# Patient Record
Sex: MALE | Race: NATIVE HAWAIIAN OR OTHER PACIFIC ISLANDER
[De-identification: names, ages, dates, MRNs, and addresses within clinical notes are randomized per-mention and may not be internally consistent; named-entity substitution may affect disease eponyms.]

---

## 2017-01-31 ENCOUNTER — HOSPITAL ENCOUNTER (OUTPATIENT)
Dept: HOSPITAL 39 - GMAH | Age: 67
End: 2017-01-31
Attending: FAMILY MEDICINE
Payer: MEDICARE

## 2017-01-31 DIAGNOSIS — Z12.5: ICD-10-CM

## 2017-01-31 DIAGNOSIS — I50.22: Primary | ICD-10-CM

## 2017-01-31 PROCEDURE — 84550 ASSAY OF BLOOD/URIC ACID: CPT

## 2017-01-31 PROCEDURE — 84443 ASSAY THYROID STIM HORMONE: CPT

## 2018-07-16 ENCOUNTER — HOSPITAL ENCOUNTER (OUTPATIENT)
Dept: HOSPITAL 39 - GMAH | Age: 68
End: 2018-07-16
Attending: FAMILY MEDICINE
Payer: COMMERCIAL

## 2018-07-16 DIAGNOSIS — I25.810: Primary | ICD-10-CM

## 2018-07-16 DIAGNOSIS — Z12.5: ICD-10-CM

## 2018-07-16 DIAGNOSIS — D72.829: ICD-10-CM

## 2018-07-16 PROCEDURE — 84443 ASSAY THYROID STIM HORMONE: CPT

## 2018-07-16 PROCEDURE — 84550 ASSAY OF BLOOD/URIC ACID: CPT

## 2019-01-15 ENCOUNTER — HOSPITAL ENCOUNTER (OUTPATIENT)
Dept: HOSPITAL 39 - LAB.O | Age: 69
End: 2019-01-15
Attending: FAMILY MEDICINE
Payer: COMMERCIAL

## 2019-01-15 DIAGNOSIS — I71.2: Primary | ICD-10-CM

## 2019-01-15 DIAGNOSIS — I70.0: ICD-10-CM

## 2019-01-15 DIAGNOSIS — D72.829: ICD-10-CM

## 2019-01-15 NOTE — CT
EXAM DESCRIPTION: 

Chest w/o Contrast : Computed Tomography.



CLINICAL HISTORY:

68 years Male THORACIC AORTIC ANEURYSM, WITHOUT RUPTURE



COMPARISON: 

CTA of the abdominal aorta and bilateral lower extremities on

this visit.



TECHNIQUE: 

Spiral-axial scans at 5 x 5 mm intervals through the lungs and

thorax without IV contrast. 2.5 x 5 mm lung algorithm axial

reconstructions. Coronal and sagittal 2.0 pleural parenchymal

scarring in the right apex inferior right middle lobe and

inferior lingula and in the posterior recess of the right lower

lobe. Mm reconstructions. No adverse reactions.  Total Exam DLP:

628.22 mGy-cm. This exam was performed according to our

departmental dose-optimization program which includes automated

exposure control, adjustment of the mA and/or kV according to

patient size and/or use of iterative reconstruction technique; to

reduce radiation dose to as low as reasonably achievable (ALARA).

 Nodule measurements under 10 mm are given as mean value of 3

axes diameters.



FINDINGS: 

Lungs and large airways: Partially solid and groundglass 5 mm

nodule subpleural lateral right middle lobe associated with

pleural extension laterally into the major fissure on axial

series 4, image 57. Stable since the prior study. Bilateral small

blebs in a centrilobular pattern predominantly in the upper

lobes. 1.6 x 1.1 cm density which appears to be part of the

lateral pleural thickening in the inferior lingula with

associated parenchymal scarring.



Pleural spaces: Bilateral pleural thickening as previously

described. No effusion or pneumothorax.



Mediastinum and Radha: Evaluation limited due to lack of IV

contrast scattered nodes throughout the mediastinum but no

enlarged nodes. No enlarged hilar nodes. Surgical clips. No soft

tissue masses.



Great vessels and Heart: Evaluation limited due to lack of IV

contrast. Atherosclerotic calcification of the proximal

brachiocephalic cephalic vessels, aortic arch, some coronary

arteries, and descending thoracic aorta. Coronary artery stents.



Soft tissues of neck base, axillae, and chest wall: Evaluation

limited due to lack of IV contrast. Small thyroid gland.

Bilateral axillary lymph nodes normal size.



Upper abdomen: Please see CTA abdominal aorta findings.



Osseous structures: Minimal spondylosis. Sternotomy wires.

Minimal arthrosis of the sternum. Anterior and posterior right

labrum and glenoid with degenerative change..



IMPRESSION: 

1. Minimal emphysematous changes bilaterally. Focal pleural

scarring more likely than groundglass nodule in the lateral

segment of the right middle lobe. Stable since the prior study

abutting the inferior lingula, taking into account differences in

CT scanning technique. Also focal pleural scarring in the

inferior lingula.

2. No hilar or mediastinal adenopathy or soft tissue mass and

other soft tissues are unremarkable. Limited evaluation due to

lack of IV contrast.

3. Right glenoid labrum and right anterior glenoid degenerative

changes. Correlate with clinical history and consider follow-up

MRI or CT, or MRI/CT arthrogram of the right shoulder.



Electronically signed by:  Eliu Jack MD  1/15/2019 5:03 PM [facility]

Workstation: [id number]

## 2019-01-15 NOTE — CT
EXAM DESCRIPTION: 

CTA Runoff: Computed Tomography.

CLINICAL HISTORY: 

THORACIC AORTIC ANEURYSM



COMPARISON: 

CT chest without contrast on this visit.



TECHNIQUE: 

CT angiography of the abdominal aorta and both lower extremities

is performed during rapid bolus administration of IV contrast

media. Three-dimensional volume-rendering imaging is reviewed

along with 2.5 x 2.5 mm source images, and 2 mm coronal and

sagittal reformats.  Total Exam DLP: 1642.39 mGy-cm.  This exam

was performed according to our departmental CT dose-optimization

program which includes automated exposure control, adjustment of

the mA and/or kV according to patient size and/or use of

iterative reconstruction technique; to reduce radiation dose to

as low as reasonably achievable (ALARA).



FINDINGS: 

Upper abdominal aorta: Atherosclerotic calcification of the ostia

of the celiac axis and the SMA no significant stenosis or

poststenotic dilation. No aortic aneurysm.

Mid-abdominal aorta: Bilateral single renal arteries with

calcification of the ostia but no stenosis or dilation. Moderate

atherosclerotic calcification. No aortic aneurysm..

Distal abdominal aorta: Moderate calcification including the

origin of the DIANE. Luminal narrowing of the aorta with no

aneurysm.

Common iliacs: Minimal atherosclerotic calcification bilaterally

with no significant narrowing or aneurysm.

Internal iliacs: Origins are unremarkable with intermittent

atherosclerotic calcification and normal caliber.

Bilateral external iliac artery: Negative.

Bilateral CFA's: Moderate calcification and minimal narrowing. No

abnormalities of the bifurcation.

Bilateral SFA's: No significant calcification narrowing or

aneurysm.

Bilateral popliteal's no significant calcification narrowing or

aneurysm.

Right trifurcation vessels: . Good Visualization of the peroneal

PTA trunk with the peroneal terminating normally just above the

ankle mortise. Good runoff of the PTA into the high posterior

ankle and plantar foot.] Fair visualization of the MIKO. Poor

runoff into the dorsalis pedis artery. 

Left trifurcation vessels: . Good visualization of the peroneal

PTA trunk. Good to fair visualization of the peroneal which

terminates normally above the ankle mortise. Good visualization

of the entire left PTA and good runoff into the left plantar

foot. Good 2 fair visualization of the left MIKO and fair runoff

into the left dorsalis pedis.

Other: Small periaortic nodes. No free air or free fluid or

peritoneal or retroperitoneal mass. Calcification in the prostate

gland. Bilateral fatty inguinal canal hernias not containing

bowel. Grade 1 anterolisthesis L4-5.



IMPRESSION: 

1. Mild to moderate atherosclerotic changes in the abdominal

aorta with no significant stenosis of the aorta or the major

branch vessels near the origins. No aneurysms.

2. Minimal iliac disease bilaterally and no aneurysms or

stenoses.

3. Bilateral femoral arterial supply is good.

4. Right inferior lower extremity arterial supply is good but

poor runoff from the MIKO into the dorsalis pedis artery. Left

inferior lower extremity arterial supply is in good with good

fair runoff into the posterior inferior foot and the anterior

dorsal foot. ECMO details.



Electronically signed by:  Eliu Jack MD  1/15/2019 4:39 PM [facility]

Workstation: [id number]

## 2019-07-17 ENCOUNTER — HOSPITAL ENCOUNTER (OUTPATIENT)
Dept: HOSPITAL 39 - GMAH | Age: 69
End: 2019-07-17
Attending: FAMILY MEDICINE
Payer: COMMERCIAL

## 2019-07-17 DIAGNOSIS — C91.10: Primary | ICD-10-CM

## 2019-07-25 ENCOUNTER — HOSPITAL ENCOUNTER (OUTPATIENT)
Dept: HOSPITAL 39 - GMAH | Age: 69
End: 2019-07-25
Attending: FAMILY MEDICINE
Payer: COMMERCIAL

## 2019-07-25 DIAGNOSIS — I42.9: Primary | ICD-10-CM

## 2019-07-25 DIAGNOSIS — Z12.5: ICD-10-CM

## 2019-10-21 ENCOUNTER — HOSPITAL ENCOUNTER (OUTPATIENT)
Dept: HOSPITAL 39 - GMA MATASK | Age: 69
End: 2019-10-21
Attending: FAMILY MEDICINE
Payer: COMMERCIAL

## 2019-10-21 DIAGNOSIS — I10: ICD-10-CM

## 2019-10-21 DIAGNOSIS — C91.10: Primary | ICD-10-CM

## 2020-01-29 ENCOUNTER — HOSPITAL ENCOUNTER (OUTPATIENT)
Dept: HOSPITAL 39 - GMA MATASK | Age: 70
End: 2020-01-29
Attending: FAMILY MEDICINE
Payer: COMMERCIAL

## 2020-01-29 DIAGNOSIS — C91.10: Primary | ICD-10-CM

## 2020-02-04 ENCOUNTER — HOSPITAL ENCOUNTER (OUTPATIENT)
Dept: HOSPITAL 39 - CT | Age: 70
End: 2020-02-04
Attending: INTERNAL MEDICINE
Payer: COMMERCIAL

## 2020-02-04 DIAGNOSIS — R16.1: ICD-10-CM

## 2020-02-04 DIAGNOSIS — C91.11: Primary | ICD-10-CM

## 2020-02-04 DIAGNOSIS — C90.00: ICD-10-CM

## 2020-02-05 NOTE — CT
EXAM DESCRIPTION: Abdomen/Pelvis w/Contrast



CLINICAL HISTORY: 69 years Male, RESTAGING LYMPHCYTIC LEUKEMIA



COMPARISON: CTA runoff dated 1/15/2019.



TECHNIQUE: Contiguous 3 mm axial images were obtained from the

lung bases to the level of the proximal femora after  the

administration of intravenous and oral contrast. Sagittal and

coronal reconstructions were reviewed.



FINDINGS: 



THORAX: The imaged lower thorax demonstrates no gross

abnormality.



LIVER: The liver demonstrates normal size and density with no

intrahepatic biliary ductal dilatation  or focal masses.



GALLBLADDER: Grossly unremarkable.



PANCREAS: Appears normal with no cystic or solid lesions.



SPLEEN: Mildly enlarged in size measuring 16.4 x 14.2 cm.



ADRENAL GLANDS: Normal with no nodules or masses.



KIDNEYS: Nonspecific bilateral perinephric stranding. However no

nephrolithiasis or hydronephrosis. No focal masses are

identified.  The visualized ureters appear grossly unremarkable. 





STOMACH: Not well distended limiting detailed evaluation.



SMALL BOWEL: The small bowel loops demonstrate variable degrees

of distention with no abnormal dilatation or other signs to

suggest bowel obstruction.



LARGE BOWEL: Majority of the colon is not well-distended limiting

detailed evaluation. Few diverticuli are identified. The appendix

is well-visualized and appears normal



No evidence of free intraperitoneal air or fluid.



RETROPERITONEUM: The abdominal aorta is nonaneurysmal with

moderate to severe atherosclerosis. The inferior vena cava is

normal in size and caliber. Multiple peripancreatic lymph nodes

measuring up to 2 cm are again noted. Few retroperitoneal lymph

nodes measuring up to 9 mm are also identified. Few subcentimeter

mesenteric lymph nodes are present. 1.2 cm left external iliac

lymph node and 8mm right external iliac lymph node. Multiple

bilateral inguinal lymph nodes measuring up to 1.2 cm.



URINARY BLADDER:The urinary bladder is well-distended with no

gross abnormality.



The prostate gland demonstrates few calcifications. Seminal

vesicles appear normal.



ADDITIONAL FINDINGS: Fat-containing bilateral inguinal hernias,

slightly larger on the left side. 



BONES:  Mild degenerative changes are identified in the

visualized bones. Minimal anterolisthesis of L4 over L5 secondary

to facet arthropathy.



IMPRESSION:

Stable peripancreatic, retroperitoneal, mesenteric, bilateral

external iliac and inguinal lymphadenopathy compared to prior

examination dated 1/15/2019.



Persistent mild splenomegaly.



No evidence of new metastatic disease within the abdomen and

pelvis.



This exam was performed according to our departmental

dose-optimization program, which includes automated exposure

control, adjustment of the mA and/or kV according to patient size

and/or use of iterative reconstruction technique.



Electronically signed by:  Melany Mcclellan MD  2/5/2020 10:27 AM

[facility] Workstation: [id number]

## 2020-02-05 NOTE — CT
EXAM DESCRIPTION: Chest w/Contrast



CLINICAL HISTORY: 69 years Male, RESTAGING LYMPHCYTIC LEUKEMIA



COMPARISON: CT chest dated 1/15/2019. CT runoff dated 1/15/2019.



TECHNIQUE: Contiguous thin section axial images through the chest

were obtained after  the administration of intravenous contrast.

Sagittal and coronal reconstructions were reviewed.



FINDINGS: The visualized thyroid gland and supraclavicular region

appear normal. Few mediastinal lymph nodes measuring up to 8mm

are noted. No abnormally enlarged bilateral hilar

lymphadenopathy. Multiple bilateral axillary lymph nodes

measuring up to 1.3 cm are noted. These also appear unchanged

compared to prior examination.



Trachea is midline and the central tracheobronchial tree is

patent. Linear atelectasis is noted in the visualized lungs. No

evidence of consolidative or groundglass changes. No nodules or

masses are visualized. No evidence of pleural effusions. 



The heart is normal in size with no pericardial effusion. The

visualized aorta is nonaneurysmal with moderate to severe

atherosclerosis. The superior vena cava is normal in size and

caliber.No significant coronary artery atherosclerosis. The

esophagus appears normal throughout its visualized length.



Mild degenerative changes are identified throughout the

visualized spine.



IMPRESSION:

Stable mediastinal and bilateral axillary lymphadenopathy

compared to prior examination dated 1/15/2019. No other newly

enlarged lymph nodes are visualized.



This exam was performed according to our departmental

dose-optimization program, which includes automated exposure

control, adjustment of the mA and/or kV according to patient size

and/or use of iterative reconstruction technique.



Electronically signed by:  Melany Mcclellan MD  2/5/2020 10:22 AM

[facility] Workstation: [id number]

## 2020-09-21 ENCOUNTER — HOSPITAL ENCOUNTER (OUTPATIENT)
Dept: HOSPITAL 39 - CT | Age: 70
End: 2020-09-21
Attending: INTERNAL MEDICINE
Payer: COMMERCIAL

## 2020-09-21 DIAGNOSIS — I25.10: ICD-10-CM

## 2020-09-21 DIAGNOSIS — C91.10: ICD-10-CM

## 2020-09-21 DIAGNOSIS — M51.86: ICD-10-CM

## 2020-09-21 DIAGNOSIS — M51.46: ICD-10-CM

## 2020-09-21 DIAGNOSIS — Z01.812: Primary | ICD-10-CM

## 2020-09-21 NOTE — CT
EXAM DESCRIPTION: 



Chest w/Contrast (accession [id number]), Abdomen/Pelvis

w/Contrast (accession [id number])



CLINICAL HISTORY: 



chronic lymphocytic leukemia of b-cell



COMPARISON: 



Chest CT February 4, 2020, CT abdomen pelvis January 15, 2019



TECHNIQUE: 



CT of the chest, abdomen and Pelvis was performed with IV

contrast. This exam was performed according to our departmental

dose-optimization program, which includes automated exposure

control, adjustment of the mA and/or kV according to patient size

and/or use of iterative reconstruction technique.



FINDINGS: 



CT Chest:



The thyroid and thoracic inlet are unremarkable. Postoperative

changes in the mediastinum. Coronary artery calcification with

additional mural calcification in the thoracic aorta, no aneurysm

or dissection. No pleural or pericardial effusion. No mediastinal

or hilar adenopathy. Axillary and mediastinal adenopathy seen on

the prior chest CT are not present on today's exam. No esophageal

wall thickening.



The central airways are clear. No airspace consolidation or lung

mass. Platelike atelectasis or scarring in the right lower lobe.

Mild subsegmental atelectasis or scarring in the lingula tiny

calcified granuloma in the left lower lobe. Elevated right

hemidiaphragm.



No axillary adenopathy or other chest wall lesion. No lytic or

sclerotic bone lesion.



CT Abdomen/Pelvis:



Mural calcification in the abdominal aorta without aneurysm or

dissection. Nonenlarged bilateral inguinal lymph nodes, stable.

No retroperitoneal or mesenteric adenopathy. The liver, spleen,

pancreas, adrenals and kidneys are unremarkable except for small

cyst superior pole right kidney.



No dilated small bowel loops or mesenteric inflammation. No

bladder wall thickening. The prostate is not enlarged. Colonic

diverticulosis without diverticulitis, normal appendix.

Degenerative changes in the lumbar spine at several levels with

grade 1 anterolisthesis at L4-5 likely related to underlying

facet joint degeneration. Compression of the L3 body likely

related to a large Schmorl's node superior endplate, but this is

new from January, 2019.



IMPRESSION: 



No evidence of recurrent or metastatic disease in the chest,

abdomen or pelvis.



New compression of the L3 vertebral body with a large Schmorl's

node superior endplate at least partially accounting for loss of

vertebral body height. If clinically suspicious of acute

compression fracture, MRI should be considered.



Atherosclerotic vascular disease including coronary artery

disease.



Electronically signed by:  Mukund Hernandez MD  9/21/2020 10:01 AM CDT

Workstation: [id number]

## 2020-09-21 NOTE — CT
EXAM DESCRIPTION: 



Chest w/Contrast (accession [id number]), Abdomen/Pelvis

w/Contrast (accession [id number])



CLINICAL HISTORY: 



chronic lymphocytic leukemia of b-cell



COMPARISON: 



Chest CT February 4, 2020, CT abdomen pelvis January 15, 2019



TECHNIQUE: 



CT of the chest, abdomen and Pelvis was performed with IV

contrast. This exam was performed according to our departmental

dose-optimization program, which includes automated exposure

control, adjustment of the mA and/or kV according to patient size

and/or use of iterative reconstruction technique.



FINDINGS: 



CT Chest:



The thyroid and thoracic inlet are unremarkable. Postoperative

changes in the mediastinum. Coronary artery calcification with

additional mural calcification in the thoracic aorta, no aneurysm

or dissection. No pleural or pericardial effusion. No mediastinal

or hilar adenopathy. Axillary and mediastinal adenopathy seen on

the prior chest CT are not present on today's exam. No esophageal

wall thickening.



The central airways are clear. No airspace consolidation or lung

mass. Platelike atelectasis or scarring in the right lower lobe.

Mild subsegmental atelectasis or scarring in the lingula tiny

calcified granuloma in the left lower lobe. Elevated right

hemidiaphragm.



No axillary adenopathy or other chest wall lesion. No lytic or

sclerotic bone lesion.



CT Abdomen/Pelvis:



Mural calcification in the abdominal aorta without aneurysm or

dissection. Nonenlarged bilateral inguinal lymph nodes, stable.

No retroperitoneal or mesenteric adenopathy. The liver, spleen,

pancreas, adrenals and kidneys are unremarkable except for small

cyst superior pole right kidney.



No dilated small bowel loops or mesenteric inflammation. No

bladder wall thickening. The prostate is not enlarged. Colonic

diverticulosis without diverticulitis, normal appendix.

Degenerative changes in the lumbar spine at several levels with

grade 1 anterolisthesis at L4-5 likely related to underlying

facet joint degeneration. Compression of the L3 body likely

related to a large Schmorl's node superior endplate, but this is

new from January, 2019.



IMPRESSION: 



No evidence of recurrent or metastatic disease in the chest,

abdomen or pelvis.



New compression of the L3 vertebral body with a large Schmorl's

node superior endplate at least partially accounting for loss of

vertebral body height. If clinically suspicious of acute

compression fracture, MRI should be considered.



Atherosclerotic vascular disease including coronary artery

disease.



Electronically signed by:  Mukund Hernandez MD  9/21/2020 10:01 AM CDT

Workstation: [id number]